# Patient Record
Sex: FEMALE | Race: WHITE | URBAN - NONMETROPOLITAN AREA
[De-identification: names, ages, dates, MRNs, and addresses within clinical notes are randomized per-mention and may not be internally consistent; named-entity substitution may affect disease eponyms.]

---

## 2017-01-01 ENCOUNTER — HISTORY (OUTPATIENT)
Dept: EMERGENCY MEDICINE | Facility: OTHER | Age: 82
End: 2017-01-01

## 2017-01-01 ENCOUNTER — COMMUNICATION - GICH (OUTPATIENT)
Dept: FAMILY MEDICINE | Facility: OTHER | Age: 82
End: 2017-01-01

## 2017-01-01 ENCOUNTER — OFFICE VISIT - GICH (OUTPATIENT)
Dept: FAMILY MEDICINE | Facility: OTHER | Age: 82
End: 2017-01-01

## 2017-01-01 ENCOUNTER — AMBULATORY - GICH (OUTPATIENT)
Dept: SCHEDULING | Facility: OTHER | Age: 82
End: 2017-01-01

## 2017-01-01 ENCOUNTER — COMMUNICATION - GICH (OUTPATIENT)
Dept: PEDIATRICS | Facility: OTHER | Age: 82
End: 2017-01-01

## 2017-01-01 ENCOUNTER — HISTORY (OUTPATIENT)
Dept: FAMILY MEDICINE | Facility: OTHER | Age: 82
End: 2017-01-01

## 2017-01-01 ENCOUNTER — HISTORY (OUTPATIENT)
Dept: MEDSURG UNIT | Facility: OTHER | Age: 82
End: 2017-01-01

## 2017-01-01 ENCOUNTER — HOSPITAL ENCOUNTER (OUTPATIENT)
Dept: MEDSURG UNIT | Facility: OTHER | Age: 82
Setting detail: OBSERVATION
Discharge: NURSING, REHAB OR LONG-TERM CARE | End: 2017-06-07
Attending: EMERGENCY MEDICINE

## 2017-01-01 DIAGNOSIS — M48.50XG: ICD-10-CM

## 2017-01-01 DIAGNOSIS — I48.19 PERSISTENT ATRIAL FIBRILLATION (H): ICD-10-CM

## 2017-01-01 DIAGNOSIS — S72.116D: ICD-10-CM

## 2017-01-01 DIAGNOSIS — S72.115D CLOSED NONDISPLACED FRACTURE OF GREATER TROCHANTER OF LEFT FEMUR WITH ROUTINE HEALING: ICD-10-CM

## 2017-01-01 DIAGNOSIS — S72.001A CLOSED FRACTURE OF NECK OF RIGHT FEMUR (H): ICD-10-CM

## 2017-01-01 DIAGNOSIS — Z20.828 CONTACT WITH AND (SUSPECTED) EXPOSURE TO OTHER VIRAL COMMUNICABLE DISEASES: ICD-10-CM

## 2017-01-01 DIAGNOSIS — F03.90 DEMENTIA WITHOUT BEHAVIORAL DISTURBANCE (H): ICD-10-CM

## 2017-01-01 DIAGNOSIS — R53.1 WEAKNESS: ICD-10-CM

## 2017-01-01 DIAGNOSIS — S72.002S FRACTURE OF UNSPECIFIED PART OF NECK OF LEFT FEMUR, SEQUELA: ICD-10-CM

## 2017-01-01 LAB
A/G RATIO - HISTORICAL: 1.2 (ref 1–2)
ABSOLUTE BASOPHILS - HISTORICAL: 0 THOU/CU MM
ABSOLUTE EOSINOPHILS - HISTORICAL: 0 THOU/CU MM
ABSOLUTE LYMPHOCYTES - HISTORICAL: 0.8 THOU/CU MM (ref 0.9–2.9)
ABSOLUTE MONOCYTES - HISTORICAL: 0.9 THOU/CU MM
ABSOLUTE NEUTROPHILS - HISTORICAL: 10 THOU/CU MM (ref 1.7–7)
ALBUMIN SERPL-MCNC: 3.7 G/DL (ref 3.5–5.7)
ALP SERPL-CCNC: 55 IU/L (ref 34–104)
ALT (SGPT) - HISTORICAL: 8 IU/L (ref 7–52)
ANION GAP - HISTORICAL: 10 (ref 5–18)
AST SERPL-CCNC: 17 IU/L (ref 13–39)
BASOPHILS # BLD AUTO: 0.2 %
BILIRUB SERPL-MCNC: 0.6 MG/DL (ref 0.3–1)
BUN SERPL-MCNC: 10 MG/DL (ref 7–25)
BUN/CREAT RATIO - HISTORICAL: 14
CALCIUM SERPL-MCNC: 9 MG/DL (ref 8.6–10.3)
CHLORIDE SERPLBLD-SCNC: 105 MMOL/L (ref 98–107)
CO2 SERPL-SCNC: 23 MMOL/L (ref 21–31)
CREAT SERPL-MCNC: 0.74 MG/DL (ref 0.7–1.3)
EOSINOPHIL NFR BLD AUTO: 0 %
ERYTHROCYTE [DISTWIDTH] IN BLOOD BY AUTOMATED COUNT: 13.5 % (ref 11.5–15.5)
GFR IF NOT AFRICAN AMERICAN - HISTORICAL: >60 ML/MIN/1.73M2
GLOBULIN - HISTORICAL: 3 G/DL (ref 2–3.7)
GLUCOSE SERPL-MCNC: 126 MG/DL (ref 70–105)
HCT VFR BLD AUTO: 35.7 % (ref 33–51)
HEMOGLOBIN: 12.1 G/DL (ref 12–16)
LYMPHOCYTES NFR BLD AUTO: 6.8 % (ref 20–44)
MCH RBC QN AUTO: 33 PG (ref 26–34)
MCHC RBC AUTO-ENTMCNC: 33.9 G/DL (ref 32–36)
MCV RBC AUTO: 97 FL (ref 80–100)
MONOCYTES NFR BLD AUTO: 7.5 %
NEUTROPHILS NFR BLD AUTO: 85.2 % (ref 42–72)
PLATELET # BLD AUTO: 229 THOU/CU MM (ref 140–440)
PMV BLD: 11.6 FL (ref 6.5–11)
POTASSIUM SERPL-SCNC: 3.7 MMOL/L (ref 3.5–5.1)
PROT SERPL-MCNC: 6.7 G/DL (ref 6.4–8.9)
RED BLOOD COUNT - HISTORICAL: 3.67 MIL/CU MM (ref 4–5.2)
SODIUM SERPL-SCNC: 138 MMOL/L (ref 133–143)
WHITE BLOOD COUNT - HISTORICAL: 11.7 THOU/CU MM (ref 4.5–11)

## 2017-12-27 NOTE — PROGRESS NOTES
Patient Information     Patient Name MRN Sex Yamilet Patel [id number] Female 3/31/1928      Progress Notes by Sharonda Persaud PharmD at 2017 11:20 AM     Author:  Sharonda Persaud PharmD Service:  (none) Author Type:  PHARM- Pharmacist     Filed:  2017 11:20 AM Date of Service:  2017 11:20 AM Status:  Signed     :  Sharonda Persaud PharmD (PHARM- Pharmacist)            Pharmacy: Discharge Counseling and Medication Reconciliation    Yamilet Chavarria  [address]   [place] [address]    Home Phone [phone]   Work Phone Not on file.     89 y.o. female  PCP:Madonna Carroll MD    Allergies     Allergen  Reactions     Amoxicillin Rash     Pcn [Penicillins] Syncope       Discharge Medication Reconciliation:    Sharonda Persaud PharmD has reviewed the patient's discharge medication orders and has compared them to the inpatient medication administration record and to what the patient was taking prior to admission- any discrepancies have been resolved.     Thank you for the consult.     Sharonda Persaud PharmD ....................  2017   11:20 AM

## 2017-12-27 NOTE — PROGRESS NOTES
Patient Information     Patient Name MRN Sex Yamilet Patel [id number] Female 3/31/1928      Progress Notes by Jelly Cam PharmD at 2017 12:26 PM     Author:  Jelly Cam PharmD Service:  (none) Author Type:  PHARM- Pharmacist     Filed:  2017 12:29 PM Date of Service:  2017 12:26 PM Status:  Signed     :  Jelly Cam PharmD (PHARM- Pharmacist)            Pharmacy -- Admission Medication Reconciliation    Prior to admission (PTA) medications were reviewed and the patient's PTA medication list was updated.     Sources Consulted: [place] [facility], [facility]    The reliability of this Medication Reconciliation is: HIGH.    The following significant changes were made:  Morphine sulfate 20 mg/mL UPDATED to reflect current order  Acetaminophen CHANGED to q4h scheduled  Senna CHANGE to PRN    Note: patient has received one dose of lorazepam and one dose of morphine in  per [place] [facility]    In addition, the patient's allergies were reviewed with the patient and updated as follows -  Allergies     Allergen  Reactions     Amoxicillin Rash     Pcn [Penicillins] Syncope       The pharmacist has reviewed with the patient that all personal medications should be removed from the building or locked in the belongings safe. Patient shall only take medications ordered by the physician and administered by the nursing staff.     Pharmacy Discharge Planning      Medication barriers identified:  none    Medication adherence concerns:  none    Understanding of emergency medications:  Has morphine/lorazepam PRN    Jelly Cam PharmD ....................  2017   12:26 PM

## 2017-12-27 NOTE — PROGRESS NOTES
Patient Information     Patient Name MRN Yamilet Gurrola [id number] Female 3/31/1928      Progress Notes by Kely Stallworth RN at 2017  1:24 PM     Author:  Kely Stallworth RN Service:  (none) Author Type:  NURS- Registered Nurse     Filed:  2017  1:24 PM Date of Service:  2017  1:24 PM Status:  Signed     :  Kely Stallworth RN (NURS- Registered Nurse)            Second attempt made for nurse to nurse.Kely Stallworth RN ....................  2017   1:24 PM

## 2017-12-27 NOTE — PROGRESS NOTES
Patient Information     Patient Name MRN Yamilet Gurrola [id number] Female 3/31/1928      Progress Notes by Kely Stallworth RN at 2017  1:42 PM     Author:  Kely Stallworth RN Service:  (none) Author Type:  NURS- Registered Nurse     Filed:  2017  1:44 PM Date of Service:  2017  1:42 PM Status:  Signed     :  Kely Stallworth RN (NURS- Registered Nurse)            Report called to TYRON Amaya. Pt is at facility and family is present.

## 2017-12-27 NOTE — PROGRESS NOTES
Patient Information     Patient Name MRN Sex Yamilet Patel [id number] Female 3/31/1928      Progress Notes by Komal Laura at 2017  6:08 AM     Author:  Komal Laura Service:  (none) Author Type:  NURS- Registered Nurse     Filed:  2017  6:08 AM Date of Service:  2017  6:08 AM Status:  Signed     :  Komal Laura (NURS- Registered Nurse)            Problem: MUSCULOSKELETAL  Goal: MAINTAIN PROPER ALIGNMENT OF AFFECTED BODY PART  Outcome: Problem reviewed and goal still appropriate  Care Plan Progress Note     Data: Pt kept as comfortable as possible throughout the night. RR in the 20s. Pain described in the R hip where fracture is located. Skin looks good. No IV access. Davidson in place with thick, cloudy, tan urine.      Action: PRN morphine x2 given for repositioning. Tolerated well. Repo q2 hrs. Not tolerating the R side. Given Ice Cream before bed.     Response: Pt is tolerant of cares and is cooperative. Continue to keep comfortable and follow the appropriate plan of care.   Komal Laura RN ....................  2017   6:08 AM

## 2017-12-28 NOTE — CARE COORDINATION
Patient Information     Patient Name MRN Yamilet Gurrola [id number] Female 3/31/1928      Case Mgmt by Jas Navarrete LSW at 2017 10:35 AM     Author:  Jas Navarrete LSW Service:  (none) Author Type:  SWS- Licensed Social Worker     Filed:  2017 10:39 AM Date of Service:  2017 10:35 AM Status:  Signed     :  Jas Navarrete LSW ([facility]- Licensed Social Worker)            :    Discharge today back to [facility] assisted living with hospice care.  I called Magda at [facility] and she was updated with discharge plan.  I called Monie from hospice and also gave her discharge update. She stated DME will be delivered today before 1300.  [facility] was called for stretcher transport and will be here at 1300 to transport. I called patients daughter Obey and gave her discharge update.      ADRYAN Prasad ....................  2017   10:37 AM

## 2017-12-28 NOTE — PROGRESS NOTES
Patient Information     Patient Name MRN Sex Yamilet Patel [id number] Female 3/31/1928      Progress Notes by Kely Stallworth RN at 2017  8:20 AM     Author:  Kely Stallworth RN Service:  (none) Author Type:  NURS- Registered Nurse     Filed:  2017  8:21 AM Date of Service:  2017  8:20 AM Status:  Signed     :  Kely Stallworth RN (NURS- Registered Nurse)            Ice pack applied to right hip. Pt repositioned.

## 2017-12-28 NOTE — PROGRESS NOTES
Patient Information     Patient Name MRN Sex Yamilet Patel [id number] Female 3/31/1928      Progress Notes by Nurys Hopper RN at 2017 12:30 PM     Author:  Nurys Hopper RN Service:  (none) Author Type:  NURS- Registered Nurse     Filed:  2017  4:52 PM Date of Service:  2017 12:30 PM Status:  Signed     :  Nurys Hopper RN (NURS- Registered Nurse)            Admission Note    Data:  Yamilet Chavarria admitted to 312 from emergency department via cart.      Action:  Family and Dr. Chavez have been notified of admission.      Response:  Patient tolerated transfer. and Patient is stable.

## 2017-12-28 NOTE — TELEPHONE ENCOUNTER
Patient Information     Patient Name MRN Yamilet Gurrola [id number] Female 3/31/1928      Telephone Encounter by Tania Cantu at 2017  9:50 AM     Author:  Tania Cantu Service:  (none) Author Type:  (none)     Filed:  2017  9:52 AM Encounter Date:  2017 Status:  Signed     :  Tania Cantu            SER- PT PASSED AWAY TODAY AT ABOUT 7:30. ANY QUESTIONS YOU CAN CALL [phone]. THANKS.

## 2017-12-29 NOTE — ED PROVIDER NOTES
Patient Information     Patient Name MRN Sex Yamilet Patel [id number] Female 3/31/1928      ED Provider Note by Kel Sandy MD at 2017  7:59 AM     Author:  Kel Sandy MD Service:  (none) Author Type:  Physician     Filed:  2017 11:25 AM Date of Service:  2017  7:59 AM Status:  Signed     :  Kel Sandy MD (Physician)            PATIENT:  Yamilet Chavarria       89 y.o.       female       MRN #:  [id number]    CHIEF COMPLAINT:  Musculoskeletal Problem      HPI Comments: Patient comes in from local assisted living after having fallen twice last night. After the second time they noticed that her right leg was externally rotated and shorter than the left. She was unable to stand after this. She was given some oral morphine at home. She was then transferred in via ambulance. She has severe dementia, her daughter is here with hopes of history. Has not been ill otherwise.    Patient is a 89 y.o. female presenting with hip injury. The history is provided by the patient and a relative.   Hip Injury        ALLERGIES:  Amoxicillin and Pcn [penicillins]    CURRENT MEDICATIONS:      ibuprofen (ADVIL; MOTRIN) 800 mg tablet   LORazepam (ATIVAN) 0.5 mg tab   MAPAP EXTRA STRENGTH 500 mg tablet   medication order composer   medication order composer   SENEXON-S 8.6-50 mg tablet   Wheel Chair     PROBLEM LIST:       Atrial fibrillation (HC)      Closed nondisp fx of greater trochanter of femur with routine healing      Compression fracture of vertebral column with delayed healing      Closed fracture of pubic ramus (HC)      Alzheimer's dementia      Toenail fungus        PAST MEDICAL HISTORY:  No past medical history on file.  PAST SURGICAL HISTORY:    Past Surgical History:      Procedure  Laterality Date     No previous surgery       FAMILY HISTORY:  No family history on file.  SOCIAL HISTORY:  Marital Status:   [5]  Employment Status:  Retired [5]   Occupation:    Substance  "Use:  Smoking status: Never Smoker                                                              Smokeless status: Never Used                      Alcohol use: No                 Review of Systems   Unable to perform ROS: Dementia        Patient Vitals for the past 24 hrs:   BP Temp Pulse SpO2 Height Weight   06/06/17 1000 123/62 - 75 - - -   06/06/17 0930 (!) 79/66 - 79 98 % - -   06/06/17 0830 102/58 - 73 98 % - -   06/06/17 0800 124/73 - 70 99 % - -   06/06/17 0737 120/97 96  F (35.6  C) 61 98 % 1.626 m (5' 4\") 59 kg (130 lb)      Physical Exam   Constitutional: She appears well-developed and well-nourished. No distress.   HENT:   Head: Normocephalic and atraumatic.   Eyes: Conjunctivae are normal.   Neck: Neck supple.   Cardiovascular: Normal rate, regular rhythm and normal heart sounds.    Pulmonary/Chest: Effort normal and breath sounds normal. No respiratory distress.   Abdominal: Bowel sounds are normal. She exhibits no distension. There is no tenderness.   Musculoskeletal:   Right leg is externally rotated and about 2 inches shorter than the left. When I rotate the leg from side to side she has pain in her hip area.   Skin: Skin is warm and dry. She is not diaphoretic.   She has a skin tear on her right forearm that is covered with some Adaptic type covering.   Psychiatric: She has a normal mood and affect.   Nursing note and vitals reviewed.         IMAGING:  XR PELVIS 1 VIEW AP AND HIP 2 VIEW BILATERAL (Final result)  Result time: 06/06/17 09:33:35     Procedure changed from XR HIP 2 OR 3 VIEWS W PELVIS RIGHT            Final result by Interface, [facility] (06/06/17 09:33:35)      Narrative:      XR PELVIS 1 VIEW AP AND HIP 2 VIEW BILATERAL    HISTORY: 89 yearsFemale MUSCULOSKELETAL PROBLEM;     TECHNIQUE: A total of 7 views were obtained.    COMPARISON: 01/03/2017    FINDINGS: There is an acute intertrochanteric fracture of the right hip. There is coxa vera angulation and loss of length. There is " inferior displacement of the inferior trochanter fragment the hip joint is congruent.    There is a corticated nonacute fracture of the greater trochanter of the left hip. This was seen on the prior study. The fracture is incompletely healed.    The sacral iliac joints pubic symphysis and left hip joint are congruent. There is advanced left hip osteoarthritis.    There is osteopenia.    IMPRESSION: Acute,  intertrochanteric fracture of the right hip with loss of length and coxa vera alignment. There is an inferiorly displaced fracture of the lesser trochanter.    Nonacute incompletely healed greater trochanter fracture of the left hip as was seen on 01/03/2017    Electronically Signed By: Jono Carey M.D. on 6/6/2017 9:29 AM          LABORATORY:  Results for orders placed or performed during the hospital encounter of 06/06/17       COMP METABOLIC PANEL       Result  Value Ref Range Status    SODIUM 138 133 - 143 mmol/L Final    POTASSIUM 3.7 3.5 - 5.1 mmol/L Final    CHLORIDE 105 98 - 107 mmol/L Final    CO2,TOTAL 23 21 - 31 mmol/L Final    ANION GAP 10 5 - 18                 Final    GLUCOSE 126 (H) 70 - 105 mg/dL Final    CALCIUM 9.0 8.6 - 10.3 mg/dL Final    BUN 10 7 - 25 mg/dL Final    CREATININE 0.74 0.70 - 1.30 mg/dL Final    BUN/CREAT RATIO           14                 Final    GFR if African American >60 >60 ml/min/1.73m2 Final    GFR if not African American >60 >60 ml/min/1.73m2 Final    ALBUMIN 3.7 3.5 - 5.7 g/dL Final    PROTEIN,TOTAL 6.7 6.4 - 8.9 g/dL Final    GLOBULIN                  3.0 2.0 - 3.7 g/dL Final    A/G RATIO 1.2 1.0 - 2.0                 Final    BILIRUBIN,TOTAL 0.6 0.3 - 1.0 mg/dL Final    ALK PHOSPHATASE 55 34 - 104 IU/L Final    ALT (SGPT) 8 7 - 52 IU/L Final    AST (SGOT) 17 13 - 39 IU/L Final   CBC WITH AUTO DIFFERENTIAL       Result  Value Ref Range Status    WHITE BLOOD COUNT         11.7 (H) 4.5 - 11.0 thou/cu mm Final    RED BLOOD COUNT           3.67 (L) 4.00 - 5.20  mil/cu mm Final    HEMOGLOBIN                12.1 12.0 - 16.0 g/dL Final    HEMATOCRIT                35.7 33.0 - 51.0 % Final    MCV                       97 80 - 100 fL Final    MCH                       33.0 26.0 - 34.0 pg Final    MCHC                      33.9 32.0 - 36.0 g/dL Final    RDW                       13.5 11.5 - 15.5 % Final    PLATELET COUNT            229 140 - 440 thou/cu mm Final    MPV                       11.6 (H) 6.5 - 11.0 fL Final    NEUTROPHILS               85.2 (H) 42.0 - 72.0 % Final    LYMPHOCYTES               6.8 (L) 20.0 - 44.0 % Final    MONOCYTES                 7.5 <12.0 % Final    EOSINOPHILS               0.0 <8.0 % Final    BASOPHILS                 0.2 <3.0 % Final    ABSOLUTE NEUTROPHILS      10.0 (H) 1.7 - 7.0 thou/cu mm Final    ABSOLUTE LYMPHOCYTES      0.8 (L) 0.9 - 2.9 thou/cu mm Final    ABSOLUTE MONOCYTES        0.9 (H) <0.9 thou/cu mm Final    ABSOLUTE EOSINOPHILS      0.0 <0.5 thou/cu mm Final    ABSOLUTE BASOPHILS        0.0 <0.3 thou/cu mm Final         ED COURSE:  ED Course     0802  it certainly appears as though she has fractured her right hip. We will get an x-ray. Also check some basic labs. She is not in any pain while she is laying still, we'll place an IV and ordered some when necessary fentanyl.    Patient with hip fracture as above. I spoke with Dr. Morrell from orthopedics. The patient's daughter is here and has conversation with Dr. Morrell regarding options. The patient does have severe dementia. The daughter has spoken with other family members and they aren't electing to not have this repaired at this time. They would like her to go back to her assisted living with a hospital bed and hospice. I did speak with hospice, and they will be happy to work with the patient, however it is not something that will be able to be arranged today. Therefore I have spoken with Dr. Villalta  hospitalists, and she will be admitted here for observation and hopefully  tomorrow can get her moved back to her assisted living.    ENCOUNTER DIAGNOSES:  ENCOUNTER DIAGNOSES        ICD-10-CM    1. Hip fracture, right, closed, initial encounter S72.001A        Coding    ROBERT MCCOY MD  DOS: 6/6/2017   [facility]

## 2017-12-29 NOTE — H&P
Patient Information     Patient Name MRN Yamilet Gurrola [id number] Female 3/31/1928      H&P by Harinder Chavez MD at 2017  1:51 PM     Author:  Harinder Chavez MD Service:  (none) Author Type:  Physician     Filed:  2017  2:32 PM Date of Service:  2017  1:51 PM Status:  Signed     :  Harinder Chavez MD (Physician)            ADMISSION HISTORY AND PHYSICAL    Date of Face to Face Service 2017     Yamilet Chavarria   [address] [address]   [place] [address]  89 y.o. female  Admission Date/Time: 2017  7:35 AM    Primary Care Provider / Referring Physician:  Madonna Carroll MD  Hospital Attending Physician:  Harinder Chavez MD   Informant:  patient    PATIENT PROFILE:    Social History Narrative    Lives at [facility];  Daughter is Obey Brennan MD who is also healthcare power of .  [phone];   Never smoker.  Non drinker.        CHIEF COMPLAINT:  fall    Patient Active Problem List       Diagnosis  Date Noted     Closed fracture of right hip (HC)  2017     Atrial fibrillation (HC)  2017     Not candidate for coumadin due falls risk.   POA declines asa        Closed nondisp fx of greater trochanter of femur with routine healing  2017     Compression fracture of vertebral column with delayed healing  2017     Closed fracture of pubic ramus (HC)       Alzheimer's dementia  2015     2010        Toenail fungus  2015       HPI:   89-year-old female history of dementia presenting with intertrochanteric hip fracture of the right. Per report patient fell twice in the prior to presentation. She was immediately unable to bear weight, received oral morphine chart he had prescribed and was brought to the emergency department. There underwent routine lab work and x-rays with notable fracture on the right and a healing intertrochanteric fracture on the left.  Recent emergency room provider discussed treatment options with family as well as  with orthopedic surgery. Per report family has elected not to undergo surgical repair and have elected for comfort care only.     REVIEW OF SYSTEMS:  Patient denies any pain while laying in bed, she would like to have some lunch, otherwise denies any nausea or vomiting and has no other new complaints. She repeatedly talks about [employer] and being from [place] and given her dementia is a poor historian and unable to give any further specifics regarding symptoms or review of systems.    ALLERGIES/SENSITIVITIES:  Allergies     Allergen  Reactions     Amoxicillin Rash     Pcn [Penicillins] Syncope       Social History     Social History Main Topics       Smoking status: Never Smoker     Smokeless tobacco: Never Used     Alcohol use No     Drug use: No     Sexual activity: No     Family History       Problem   Relation Age of Onset     No Known Problems  Other      noncontributory          PREADMISSION MEDICATIONS  Current Facility-Administered Medications        Medication  Dose Route Frequency Last Rate     acetaminophen 500 mg tablet (TYLENOL EXTRA STRGTH)  500 mg Oral q4h       acetaminophen 650 mg tablet (TYLENOL)  650 mg Oral q4h prn       acetaminophen suppository 650 mg (TYLENOL)  650 mg Rectal q4h prn       artificial tear ophthalmic ointment 0.035 g (ARTIFICIAL TEARS)  1 Strip Both Eyes bedtime prn       bisacodyl 10 mg suppository (DULCOLAX)  10 mg Rectal daily prn       diphenhydrAMINE 12.5-25 mg injection (BENADRYL)  12.5-25 mg Intravenous q6h prn       fentaNYL 25 mcg injection (SUBLIMAZE)  25 mcg Intravenous q10min prn       HYDROmorphone 0.2-1 mg injection (DILAUDID)  0.2-1 mg Intravenous q1h prn       LORazepam 0.5-2 mg tablet (ATIVAN)  0.5-2 mg Oral q4h prn      Or         LORazepam 0.5-2 mg tablet (ATIVAN)  0.5-2 mg Sublingual q4h prn       milk of magnesia 30 mL suspension (MOM)  30 mL Oral daily prn       morphine CONCENTRATE 2.5-5 mg oral liquid (ROXANOL)  2.5-5 mg Sublingual q1h prn       NaCl 0.9%  "  10 mL/hr Intravenous continuous       ondansetron 4 mg injection (ZOFRAN)  4 mg Intravenous q6h prn       polyvinyl alcohol ophthalmic solution 2 Drop (ARTIFICAL TEARS)  2 Drop Both Eyes q1h prn       prochlorperazine 10 mg tablet (COMPAZINE)  10 mg Oral q6h prn       prochlorperazine suppository 25 mg (COMPAZINE)  25 mg Rectal q12h prn       sennosides-docusate (8.6-50 mg) 1 tablet (SENOKOT S)  1 tablet Oral daily prn         PHYSICAL EXAM:    /54  Pulse 75  Temp 98.2  F (36.8  C)  Resp 22  Ht 1.626 m (5' 4\")  Wt 57.6 kg (126 lb 15.8 oz)  SpO2 97%  Breastfeeding? No  BMI 21.8 kg/m2    Body mass index is 21.8 kg/(m^2).    Exam:   GENERAL: Talkative, in no apparent distress.  Head: normocephalic and atraumatic  Eyes: anicteric and non-injected sclera  Nose: no rhinorrhea or epistaxis.   Throat: dry mucous membranes with no active oral lesions.  NECK: Supple, jugular venous distension not present.  CARDIOVASCULAR: regular rate and rhythm, no murmurs, rubs, or gallops. Normal S1/S2. No lower extremity edema.   RESPIRATORY: clear to auscultation bilaterally, no wheezes, no crackles.    GI: soft, non-tender, non-distended, normoactive bowel sounds. Davidson in place with lisy colored urine.  MUSCULOSKELETAL: warm and well perfused, 2+ dorsalis pedis pulses.  Clearly externally rotated and shortened right leg.  SKIN: Skin tear lateral to the right elbow covered with Tegaderm, clean dry and intact.  NEUROLOGY: AAOx1 to person, she is unable to remember her daughters name without prompting, follows commands, speech and language without focal deficits.      Laboratory:  Recent Labs       06/06/17   0805   WBC  11.7 H   HGB  12.1   MCV  97   PLT  229      Recent Labs       06/06/17   0805   SODIUM  138   POTASSIUM  3.7   CHLORIDE  105   [id number]  23   BUN  10   CREATININE  0.74   GLUCOSE  126 H   CALCIUM  9.0      Recent Labs       06/06/17   0805   ALBUMIN  3.7   BILITOTAL  0.6   ALT  8   AST  17   PROTEIN  6.7 "     Additional Comments:   I reviewed the patient's new clinical lab test results.   I reviewed the patient s new imaging test results.      Assessment and Plan:       Closed fracture of right hip (HC) (6/6/2017)    Assessment: Discussed with family regarding treatment options and they remain consistent that patient would want to pursue comfort measures only. Given this they have opted for nonsurgical management with hopeful prompt discharge back to assisted living with the aid of hospice in 1-2 days.     Plan: - comfort care orderset placed   - ip hospice consult   - prescription for hospital bed completed    Harinder Chavez MD

## 2017-12-29 NOTE — ED NOTES
Patient Information     Patient Name MRN Yamilet Gurrola [id number] Female 3/31/1928      ED Nursing Note by Yvette Nagel RN at 2017  7:42 AM     Author:  Yvette Nagel RN Service:  (none) Author Type:  NURS- Registered Nurse     Filed:  2017  7:43 AM Date of Service:  2017  7:42 AM Status:  Signed     :  Yvette Nagel RN (NURS- Registered Nurse)            EMS Arrival Note  ________________________________    Pre hospital clinical presentation per EMS personnel and family member includes pt fell X2 during the night, the last time at 0600 when she had external rotation and shortening of the right hip.  Patient is alert.    Pre hospital care included: splint on right leg    Pre hospital prior living situation: Assisted Living

## 2017-12-29 NOTE — ED NOTES
Patient Information     Patient Name MRN Sex Yamilet Patel [id number] Female 3/31/1928      ED Nursing Note by Yvette Nagel RN at 2017  7:55 AM     Author:  Yvette Nagel RN Service:  (none) Author Type:  NURS- Registered Nurse     Filed:  2017  7:56 AM Date of Service:  2017  7:55 AM Status:  Signed     :  Yvette Nagel RN (NURS- Registered Nurse)            Daughter and son in law in room with pt.

## 2017-12-29 NOTE — ED NOTES
Patient Information     Patient Name MRN Sex Yamilet Patel [id number] Female 3/31/1928      ED Nursing Note by Yvette Nagel RN at 2017 12:10 PM     Author:  Yvette Nagel RN Service:  (none) Author Type:  NURS- Registered Nurse     Filed:  2017 12:11 PM Date of Service:  2017 12:10 PM Status:  Signed     :  Yvette Nagel RN (NURS- Registered Nurse)            ADMISSION/TRANSPORT NOTE    Yamilet Chavarria will be transported to [facility] by cart with IV(s). IV Site #1 Status: infusing with fluids: NS .    Transport team included: ED Tech    Report called to TYRON Nuno    Pain Level: 5/10  Acute Pain Intensity (0-10): 5  Functional Pain Scale (0-5): Tolerable (and doesn't prevent any activities)    Disposition of Patient Belongings: Clothing to room with pt and dtr.

## 2017-12-29 NOTE — ED NOTES
Patient Information     Patient Name MRN Sex Yamilte Patel [id number] Female 3/31/1928      ED Nursing Note by Yvette Nagel RN at 2017 11:38 AM     Author:  Yvette Nagel RN Service:  (none) Author Type:  NURS- Registered Nurse     Filed:  2017 11:39 AM Date of Service:  2017 11:38 AM Status:  Signed     :  Yvette Nagel RN (NURS- Registered Nurse)            Pt to be admitted, Hospice notified by Dr. Sandy and information requested by Hospice faxed.  MSP aware.  Waiting to give report.

## 2017-12-29 NOTE — ED NOTES
Patient Information     Patient Name MRN Sex Yamilet Patel [id number] Female 3/31/1928      ED Nursing Note by Yvette Nagel RN at 2017  7:52 AM     Author:  Yvette Nagel RN Service:  (none) Author Type:  NURS- Registered Nurse     Filed:  2017  7:55 AM Date of Service:  2017  7:52 AM Status:  Signed     :  Yvette Nagel RN (NURS- Registered Nurse)            Settled in [place] [address], call light in place, name and birth date verified with dtr.  Blankets provided.  Side rails up for safety.    ED Nursing Assessment  ________________________________    GENERAL APPEARANCE:   Alert  EXTREMITIES/SKIN:   Abrasion present when arrived with a tegaderm over it  NEUROLOGIC:   Orientation/LOC status: (A) alert, (V) responsive to voice and oriented to person, Behavior is cooperative and speech is clear, hx of dementia  RESPIRATORY:   Breath Sounds are clear, Oxygenation Saturation on room air is 95 %, Respirations are normal  CARDIAC:   Chest Pain is absent, Rate is irregular, Rhythm (heart) status: see rhythm strips  ABDOMEN/GI:   Contour of abdomen is flat and soft, Bowel sounds are normal, Functional status: other unsure when ate or drank last, did have MS at 0600  GENITOURINARY:   Urination: incontinence  MUSCULOSKELETAL:   EXTREMETIES   Affected Extremity Right lower extremity hip shortening and rotated after a fall.

## 2017-12-30 NOTE — DISCHARGE SUMMARY
Patient Information     Patient Name MRN Sex Yamilet Patel [id number] Female 3/31/1928      Discharge Summaries by Harinder Chavez MD at 2017 10:16 AM     Author:  Harinder Chavez MD Service:  (none) Author Type:  Physician     Filed:  2017 10:25 AM Date of Service:  2017 10:16 AM Status:  Signed     :  Harinder Chavez MD (Physician)            HOSPITAL DISCHARGE SUMMARY    Patient Name: Yamilet Chavarria  YOB: 1928  Age: 89 y.o.  Medical Record Number: [id number]  Primary Physician: Madonna Carroll MD  Phone: [phone]  Admission Date: 2017  Discharge Date: 2017     She will be discharged from [facility] to [facility] with Hospice.    PRINCIPAL DISCHARGE DIAGNOSIS: Right intertrochanteric hip fracture    BRIEF HOSPITAL COURSE:  89-year-old female hx of dementia presenting with intertrochanteric hip fracture of the right. Per report patient fell twice in the night prior to presentation. She was immediately unable to bear weight on the affected side, received oral morphine,  and was brought to the emergency department. There she underwent routine lab work and x-rays with notable fracture on the right and a healing intertrochanteric fracture on the left and was placed on observation for further care.      Closed fracture of right hip (HC) (2017): Discussed with family regarding treatment options and they remained consistent that patient would want to pursue comfort measures only. Given this they opted for nonsurgical management with enrollment in hospice.  DME including hospital bed prescription was provided. She already had ativan and roxanol medications at home, but dosing was liberalized, to be tailored to her needs to optimize comfort. She did have a white placed and this will need to be changed monthly per routine.     Exam on day of discharge:  Gen: Laying in bed, verbalizes spontaneously and awakens easily  "to voice, no apparent distress  MSK: right leg with clear external rotation and shortening  CV: Regular rate and rhythm  Pulm: Clear to auscultation bilaterally in all fields    PROCEDURES PERFORMED DURING HOSPITALIZATION:   X-ray pelvis and hip bilateral: IMPRESSION: Acute,  intertrochanteric fracture of the right hip with loss of length and coxa vera alignment. There is an inferiorly displaced fracture of the lesser trochanter.  Nonacute incompletely healed greater trochanter fracture of the left hip as was seen on 01/03/2017     COMPLICATIONS IN HOSPITAL: None    PERTINENT FINDINGS/RESULTS AT DISCHARGE: /54  Pulse 75  Temp 98.2  F (36.8  C)  Resp (!) 26  Ht 1.626 m (5' 4\")  Wt 57.6 kg (126 lb 15.8 oz)  SpO2 97%  Breastfeeding? No  BMI 21.8 kg/m2   Patient Vitals for the past 72 hrs:   Weight   06/06/17 1230 57.6 kg (126 lb 15.8 oz)   06/06/17 0737 59 kg (130 lb)    None    Latest Laboratory Results:  Chem:  Recent Labs       06/06/17   0805   SODIUM  138   POTASSIUM  3.7   CREATININE  0.74     WBC/Hgb:  Recent Labs       06/06/17   0805   WBC  11.7 H   HGB  12.1     INR:  No results for input(s): INR in the last 720 hours.      IMPORTANT PENDING TEST RESULTS:       These Lab Items may not have been resulted by the time the patient was discharged:            None          CONDITION AT DISCHARGE:    Terminal    DISCHARGE ORDERS     Your Home Medicines      CHANGE how you take these medicines       Instructions    LORazepam 0.5 mg Tab   For diagnoses:  Closed nondisplaced fracture of greater trochanter of left femur with routine healing, subsequent encounter   What changed:  See the new instructions.   Commonly known as:  ATIVAN    Take 1-2 tablets by mouth every hour if needed for Anxiety, Nausea/Vomiting or Muscle Spasm.       morphine CONCENTRATE 20 mg/mL concentrated solution   For diagnoses:  Hip fracture, right, closed, initial encounter   What changed:    - medication strength  - how much to " take   Commonly known as:  ROXANOL    Take 0.1-0.5 mL by mouth every 2 hours if needed  (PAIN)         CONTINUE taking these medicines       Instructions    acetaminophen 500 mg tablet   Commonly known as:  TYLENOL EXTRA STRGTH    Take 500 mg by mouth every 4 hours. Max acetaminophen dose: 4000mg in 24 hrs.       ibuprofen 200 mg tablet   Commonly known as:  ADVIL; MOTRIN    Take 800 mg by mouth every 6 hours if needed for Pain.       medication order composer    Ap Becalm'd capsules: Take two capsules as needed for behaviors. [Contains:Vitamin B6 (Pyridoxal 5 Phosphate), Folic Acid (Folate), Calcium (Citrate), Magnesium (Citrate), D/L-Phenylalanine, L-Glutamine, 5 HTP (5 Hydroxy Tryptophan)]       SENEXON-S 8.6-50 mg tablet   Generic drug:  sennosides-docusate (8.6-50 mg)    Take 1 tablet by mouth once daily if needed for Constipation.       Wheel Chair   For diagnoses:  Closed left hip fracture, sequela    Wheelchair: Standard  with leg rests: Swing away Length of need: 3 months Dx. left greater trochanter,   pubic rami fractures           After Discharge Orders and Instructions     Additional information about your medicines:       Use the morphine as needed for pain and the Ativan as needed for anxiety and muscle spasms, nausea, and vomiting.  These frequencies and doses of medication may be adjusted based on hospice preference.       Other activities:       Strict non-weight bearing on the right side for at least 30 days       Primary Care Provider follow up appointment(s)       Madonna Carroll MD as needed       Regular Diet       Eat a wide variety of foods, including fruits and vegetable, dairy, grains and meats.  Limit the amount of solid fat such as butter, margarine and shortening.  Get most of your fat sources from fish, nuts and vegetable oils.       When should you be concerned?       Your health care provider is:  Madonna Carroll MD    Please call your health care provider if:    - you feel you are  getting worse or having an increase in problems    - fever greater than 101 degrees  - increasing shortness of breath  - any signs of infection (increasing redness, swelling, tenderness, warmth, change in appearance, or  increased drainage)  - blood in your urine or stool  - coughing or vomiting blood  - nausea (upset stomach) and vomiting and/or diarrhea that will not stop  - severe pain that is not relieved by medicine, rest or ice    Call 911 if you feel you are having a medical emergency.       Why you were at the hospital       The reason(s) you were in the hospital is/are: Right-sided hip fracture                 FOLLOW-UP: She should see Madonna Carroll MD PRN.    Specialty follow-up: None    Total time spent for discharge on date of discharge: <35 minutes  I saw the patient on the date of discharge.    Harinder Chavez MD

## 2018-01-02 NOTE — TELEPHONE ENCOUNTER
Patient Information     Patient Name MRN Yamilet Gurrola [id number] Female 3/31/1928      Telephone Encounter by Daisy Martino at 2017  1:11 PM     Author:  Daisy Martino Service:  (none) Author Type:  NURS- Registered Nurse     Filed:  2017  1:16 PM Encounter Date:  2017 Status:  Signed     :  Daisy Martino (NURS- Registered Nurse)            URGENT: Response needed within 24 hours    This timeframe is established by CMS as  best practice  for the delivery of home health care. The home health clinician may need to contact you again if this timeframe is not met.      S:    Medication reconciliation discrepancies and/or drug interactions/contraindications have been identified.  Home Care s drug regime review has revealed significant medication issues.    B:    You are being contacted for orders related to medication issues.     A:     Client was seen for admission to home care, no drug to drug interactions noted. See the following medication discrepancies:    1. She is taking morphine sulfate 20 mg/ml 0.1 ml po every 2 hours as needed.  2. She uses tolnaftate 1% topical daily as needed to fungal toenails.     Thanks, SALMA ThomasN, RN ....................  2017   1:16 PM Ext. [phone]      R:    Please evaluate this information and indicate below whether or not changes are required. A copy of the patient's drug interaction/contraindications report is available upon request.     Thank you for your time. Please call with questions or concerns.

## 2018-01-02 NOTE — TELEPHONE ENCOUNTER
Patient Information     Patient Name MRN Sex Yamilet Patel [id number] Female 3/31/1928      Telephone Encounter by Madonna Carroll MD at 2017  4:45 PM     Author:  Madonna Carroll MD Service:  (none) Author Type:  Physician     Filed:  2017  4:46 PM Encounter Date:  2017 Status:  Signed     :  Madonna Carroll MD (Physician)            Fine, but I have not done a face to face and will not sign an order that says so.  Dr. Le's exam should suffice.

## 2018-01-02 NOTE — TELEPHONE ENCOUNTER
Patient Information     Patient Name MRN Yamilet Gurrola [id number] Female 3/31/1928      Telephone Encounter by Tania Mendoza at 2017 12:58 PM     Author:  Tania Mendoza Service:  (none) Author Type:  NURS- Licensed Practical Nurse     Filed:  2017  1:10 PM Encounter Date:  2017 Status:  Signed     :  Tania Mendoza (NURS- Licensed Practical Nurse)            The patient was  discharged from [facility] with orders from  for Home Care services skilled nursing and physical therapy to evaluate and treat. Are you agreeable to Home Care providing services starting on _2017, per patient s/POA's request?

## 2018-01-02 NOTE — TELEPHONE ENCOUNTER
"Patient Information     Patient Name MRN Yamilet Gurrola [id number] Female 3/31/1928      Telephone Encounter by Maday Pruitt at 2017  4:11 PM     Author:  Maday Pruitt Service:  (none) Author Type:  (none)     Filed:  2017  4:13 PM Encounter Date:  2017 Status:  Signed     :  Maday Pruitt            Request for Home Care Physical Therapy orders as follows:    PT eval and treat date:  17    Frequency =  2 x week x 4 weeks              Effective date = 17    Interventions include:    Therapeutic Exercise  Gait Training  Gait/Balance/Dysfunction  Home Exercise Program  Home Safety Assessment    **Request for Manual Wheelchair order to be faxed to [facility] due to impaired mobility.    Acknowledging this order with an \"OK\" will suffice. Thank you for your time.  Please call if you have any questions or concerns.    Therapist:  Shabana Rivera, PT          "

## 2018-01-03 NOTE — TELEPHONE ENCOUNTER
Patient Information     Patient Name MRN Yamilet Gurrola [id number] Female 3/31/1928      Telephone Encounter by Imelda Morrell RN at 2017  2:20 PM     Author:  Imelda Morrell RN Service:  (none) Author Type:  NURS- Registered Nurse     Filed:  2017  2:20 PM Encounter Date:  2017 Status:  Signed     :  Imelda Morrell RN (NURS- Registered Nurse)            Daughter was notified and transferred to [facility].  Imelda Morrell RN........2017 2:20 PM

## 2018-01-03 NOTE — TELEPHONE ENCOUNTER
Patient Information     Patient Name MRN Yamilet Gurrola [id number] Female 3/31/1928      Telephone Encounter by Sanjana Rosa RN at 2017  4:44 PM     Author:  Sanjana Rosa RN Service:  (none) Author Type:  (none)     Filed:  2017  4:46 PM Encounter Date:  2017 Status:  Signed     :  Sanjana Rosa RN (NURS- Registered Nurse)            Office visit in the past 12 months or per provider note.    Last visit with [facility] was on: 2017 in [facility]  Next visit with [facility] is on: No future appointment listed with this provider  Next visit with Family Practice is on: No future appointment listed in this department    Max refill for 12 months from last office visit or per provider note.    Office visit in the past 12 months or per provider note.    Last visit with [facility] was on: 2017 in [facility]  Next visit with [facility] is on: No future appointment listed with this provider  Next visit with Family Practice is on: No future appointment listed in this department    Max refill for 12 months from last office visit or per provider note.    Prescription refilled per RN Medication Refill Policy.................... Sanjana Rosa ....................  2017   4:44 PM

## 2018-01-03 NOTE — TELEPHONE ENCOUNTER
Patient Information     Patient Name MRN Sex Yamilet Patel [id number] Female 3/31/1928      Telephone Encounter by Maday Pruitt at 2017 11:06 AM     Author:  Maday Pruitt Service:  (none) Author Type:  (none)     Filed:  2017 11:08 AM Encounter Date:  2017 Status:  Signed     :  Maday Pruitt Dr., are you willing to order the manual wheelchair for impaired mobility for the patient and have it faxed to [facility]?    Thank you,   Shabana Rivera, PT

## 2018-01-03 NOTE — PROGRESS NOTES
Patient Information     Patient Name MRN Sex Yamilet Patel [id number] Female 3/31/1928      Progress Notes by Madonna Carroll MD at 2017 10:45 AM     Author:  Madonna Carroll MD Service:  (none) Author Type:  Physician     Filed:  2017  5:59 PM Encounter Date:  2017 Status:  Signed     :  Madonna Carroll MD (Physician)            Nursing Notes:   Susan Montanez  2017 10:55 AM  Signed  Patient is here today for a hospital follow up. Her daughter states they need homecare orders and a wheel chair order today.  Susan Montanez LPN.......................... 2017  10:50 AM       Subjective:  Yamilet Chavarria is a 88 y.o. female who presents for face to face; hospital follow-up She is accompanied by her daughter Obey Brennan, who is her healthcare power of .      Patient she here with her daughter  as needing  a wheelchair.    Patient was evaluated in the emergency room on 1/3/2017 after she hadn't been walking as much. In the emergency room it was discovered that she had a minimally displaced left trochanteric fracture and a nondisplaced pubic ramus fracture and lumbar compression fracture. She was evaluated by orthopedics after being placed in the hospital for orthopedic surgery consultation and pain management. It was felt that it was nonsurgical and that patient could ambulate as tolerated but would need assistive devices. She already lives in an assisted living facility. She was discharged on 17 back to home.          She is unable to walk without assistance with both a personal aide and walker for short distances.  She is able to walk at most 10- 15 feet.   She also requires a wheelchair for long distances.   she is here for home care face to face exam per medicare/ homecare guidelines.   She has been receiving homecare physical therapy.   She has learned how to transfer.  She has alzheimers.  She requires morphine  infrequently for pain. Daughter feels that she is in more pain than she is able to describe secondary to her end-stage Alzheimer's.      AFIB  During ER visit, patient noted to be in afib.  She is a falls risk.  Daughter, who is MD, does not want her on aspirin.         Review of systems  no fevers or chills; no nausea vomiting or diarrhea. No constipation She is anxious at times. No sundowning. She tends not to wait for people to help her get up. She needs redirection and constant supervision. She is in need of help with all her ADLs.    Allergies: reviewed in EMR  Medications: reviewed in EMR  Problem List/PMH: reviewed in EMR    Social Hx:  Social History     Substance Use Topics       Smoking status: Never Smoker     Smokeless tobacco: Never Used     Alcohol use No     Social History Narrative    Lives at [facility];  Daughter is Obey Brennan MD who is also healthcare power of .  [phone];   Never smoker.  Non drinker.          Family Hx:   No family history on file.    Objective:  Visit Vitals       /70     Pulse 84     Resp 20     Breastfeeding No       patient is alert oriented to person only.  She is able to stand up with use of her wheelchair in locked position. Her legs are wobbly. It takes her 10-15 seconds to stand up. Once she is up she requires assistance of another person to hold both of her hands. She walks in a short step-like fashion. She has discomfort when picking up her right leg. She needs assistance to sit back down into wheelchair. She appears uncomfortable but denies pain. PERRLA EOMI moist mucous membranes neck is supple lungs clear heart sounds regular abdomen is soft nontender without pedis or megaly or CVA tenderness. Extremities are without edema. She has discomfort with palpation over her left hip though mild. Mild discomfort with internal/external rotation of her left hip; nontender rotation of right hip .   Skin is warm and intact.      BMI=  20.5    Assessment:    ICD-10-CM    1. Closed nondisplaced fracture of greater trochanter of femur with routine healing, unspecified laterality, subsequent encounter S72.116D    2. Compression fracture of vertebral column with delayed healing M48.50XG    3. Generalized weakness R53.1    4. Persistent atrial fibrillation (HC) I48.1         Will continue to monitor patient's pulse.  At this time hold off on betablocker for rate control given falls risk and low normal blood pressure.  Pulse likely elevated. Due to pain.     Ms. Chavarria's There is no height or weight on file to calculate BMI. This is out of the normal range for a 88 y.o. Normal range for ages 18-64 is between 18.5 and 24.9; normal range for ages 65+ is 23-30. To gain weight we reviewed risks and benefits of several options including diet, exercise, and supplements. Patient's strategy will be eat as able, supplements with meals if needed       Plan:   -- Expected clinical course discussed   -- Medications and their side effects discussed  Patient Instructions   PT for another week by home care  Patient's daughter declined aspirin for afib;            Electronically signed by Madonna Carroll MD          Home Health Certification/Face to Face Attestation:     I certify that this patient is under my care and that I, or a nurse practitioner (NP) with whom I collaborate or physician assistant (PA) whom I supervise, had a face-to-face encounter that meets the face-to-face encounter requirements with this patient during this visit.    Date of the Face to Face Encounter:  1/18/17    I certify, based on my findings, review of the medical records, and/or collaboration with the NP or PA, the following services are medically necessary home health services: home PT this past two weeks and okay additional week.  Will need wheel chair long term for assistance with mobility       Clinical findings support the need for the above services for this patient because  of:  Activity intolerance , Significant weakness and decreased strength/endurance and balance as well as pain .   She can walk total of 15 feet with assistance with walker &  Personal assist .    This patient is homebound based on my clinical findings, review of the medical records, and/or collaboration with the NP or the PA whom I supervise because:   There is a taxing effort to leave the home due to balance and pain issues and need for constant supervision due to advancing alzheimer's. .   The patient requires assistance to leave the home for safety due to  Falls during winter/ transfers.     Patient requires the assistance of another individual, a walker and a wheelchair in order to leave the home.    The services identified in this certification are medically necessary home health services.         Electronically signed,   Madonna Carroll MD ....................  1/18/2017   11:20 AM

## 2018-01-03 NOTE — TELEPHONE ENCOUNTER
"Patient Information     Patient Name MRN Yamilet Gurrola [id number] Female 3/31/1928      Telephone Encounter by Maday Pruitt at 2017  2:57 PM     Author:  Maday Pruitt Service:  (none) Author Type:  (none)     Filed:  2017  2:59 PM Encounter Date:  2017 Status:  Signed     :  Maday Pruitt            Request for Home Care Physical Therapy orders as follows:      Continuation frequency =  2 x week x __2__ weeks              Effective date =  17    Interventions include:    Therapeutic Exercise  Gait Training  Gait/Balance/Dysfunction  Home Exercise Program  Home Safety Assessment      Acknowledging this order with an \"OK\" will suffice. Thank you for your time.  Please call if you have any questions or concerns.    Therapist:  Shabana Rivera, PT        "

## 2018-01-03 NOTE — TELEPHONE ENCOUNTER
Patient Information     Patient Name MRN Yamilet Gurrola [id number] Female 3/31/1928      Telephone Encounter by Madonna Carroll MD at 2017 11:28 AM     Author:  Madonna Carroll MD Service:  (none) Author Type:  Physician     Filed:  2017 11:29 AM Encounter Date:  2017 Status:  Signed     :  Madonna Carroll MD (Physician)            NO;   Patient does not need physical therapy.  She is swimming at [facility].

## 2018-01-03 NOTE — TELEPHONE ENCOUNTER
Patient Information     Patient Name MRN Sex Yamilet Patel [id number] Female 3/31/1928      Telephone Encounter by aMdonna Carroll MD at 1/10/2017  3:52 PM     Author:  Madonna Carroll MD Service:  (none) Author Type:  Physician     Filed:  1/10/2017  3:53 PM Encounter Date:  2017 Status:  Signed     :  Madonna Carroll MD (Physician)            MD answered question as below

## 2018-01-03 NOTE — TELEPHONE ENCOUNTER
Patient Information     Patient Name MRN Sex Yamilet Patel [id number] Female 3/31/1928      Telephone Encounter by Madonna Carroll MD at 2017 12:37 PM     Author:  Madonna Carroll MD Service:  (none) Author Type:  Physician     Filed:  2017 12:50 PM Encounter Date:  2017 Status:  Signed     :  Madonna Carroll MD (Physician)            I believe I took care of the wheel chair.   If not reprint and send to [facility].     Office visit only if her insurance requires a face to face, given CT results as below.       IMPRESSION:       1.  Findings consistent with spinal stenosis in the lumbar spine.    2.  Minimally displaced fracture through the left greater trochanter is   unhealed.  (4:51-63).    3.  There may be incompletely healed left pubic rami fractures.    4.  Possible nondisplaced fractures of the right hemisacrum     THIS DOCUMENT HAS BEEN ELECTRONICALLY SIGNED BY RAJAN KUHN MD

## 2018-01-03 NOTE — PATIENT INSTRUCTIONS
Patient Information     Patient Name MRN Sex Yamilet Patel [id number] Female 3/31/1928      Patient Instructions by Madonna Carroll MD at 2017 10:45 AM     Author:  Madonna Carroll MD  Service:  (none) Author Type:  Physician     Filed:  2017  5:58 PM  Encounter Date:  2017 Status:  Addendum     :  Madonna Carroll MD (Physician)        Related Notes: Original Note by Madonna Carroll MD (Physician) filed at 2017 11:34 AM            PT for another week by home care  Patient's daughter declined aspirin for afib;

## 2018-01-03 NOTE — TELEPHONE ENCOUNTER
Patient Information     Patient Name MRN Sex Yamilet Patel [id number] Female 3/31/1928      Telephone Encounter by Madonna Carroll MD at 1/10/2017 10:19 AM     Author:  Madonna Carroll MD Service:  (none) Author Type:  Physician     Filed:  1/10/2017 10:20 AM Encounter Date:  2017 Status:  Signed     :  Madonna Carroll MD (Physician)            Fine with current meds

## 2018-01-03 NOTE — TELEPHONE ENCOUNTER
Patient Information     Patient Name MRN Sex Yamilet Patel [id number] Female 3/31/1928      Telephone Encounter by Imelda Morrell RN at 2017  1:11 PM     Author:  Imelda Morrell RN Service:  (none) Author Type:  NURS- Registered Nurse     Filed:  2017  1:15 PM Encounter Date:  2017 Status:  Signed     :  Imelda Morrell RN (NURS- Registered Nurse)            Per [facility], patient requires face-to-face prior to receiving wheelchair due to insurance. Message left for daughter to return call.  Imelda Morrell RN........2017 1:15 PM

## 2018-01-03 NOTE — TELEPHONE ENCOUNTER
Patient Information     Patient Name MRN Yamilet Gurrola [id number] Female 3/31/1928      Telephone Encounter by Susan Montanez at 2017 11:11 AM     Author:  Susan Montanez Service:  (none) Author Type:  (none)     Filed:  2017 11:11 AM Encounter Date:  2017 Status:  Signed     :  Susan Montanez            Spoke to home care they states that Patient's daughter is going to schedule a face to face visit. Nothing has been scheduled at this time.  Susan Montanez LPN.......................... 2017  11:11 AM

## 2018-01-04 NOTE — TELEPHONE ENCOUNTER
Patient Information     Patient Name MRN Sex Yamilet Patel [id number] Female 3/31/1928      Telephone Encounter by Temi Andrea at 3/28/2017  1:04 PM     Author:  Temi Andrea Service:  (none) Author Type:  (none)     Filed:  3/28/2017  1:06 PM Encounter Date:  3/28/2017 Status:  Signed     :  Teim Andrea            Another Resident was recently DX with Influenza A and they would like to do prophylactic Treatment with Tamiflu

## 2018-01-04 NOTE — TELEPHONE ENCOUNTER
Patient Information     Patient Name MRN Yamilet Gurrola [id number] Female 3/31/1928      Telephone Encounter by Temi Andrea at 3/28/2017  4:40 PM     Author:  Temi Andrea Service:  (none) Author Type:  (none)     Filed:  3/28/2017  4:40 PM Encounter Date:  3/28/2017 Status:  Signed     :  Temi Andrea            Left message that tamiflu was sent to pharmacy  Temi Andrea LPN........................3/28/2017  4:40 PM

## 2018-01-05 NOTE — TELEPHONE ENCOUNTER
Patient Information     Patient Name MRN Yamilet Gurrola [id number] Female 3/31/1928      Telephone Encounter by Khadra Benjamin RN at 2017  9:40 AM     Author:  Khadra Benjamin RN Service:  (none) Author Type:  NURS- Registered Nurse     Filed:  2017  9:45 AM Encounter Date:  2017 Status:  Signed     :  Khadra Benjamin RN (NURS- Registered Nurse)            LORazepam (ATIVAN) 0.5 mg tab  TAKE ONE TABLET BY MOUTH EVERY 4 HOURS IF NEEDED FOR ANXIETY, SLEEP OR MUSCLE SPASMS       Disp: 30 tablet Refills:     Class: eRx Start: 2017    For: Closed nondisplaced fracture of greater trochanter of left femur with routine healing, subsequent encounter  Originally ordered: 4 months ago by Nikita Le MD  Last refill:2017  To be filled at: [facility], [address]: [phone]    Last visit with Madonna Carroll MD  was on: 2017  PCP:  Madonna Carroll MD  Controlled Substance Agreement:  None noted        Unable to complete prescription refill per RN Medication Refill Policy.................... KHADRA BENJAMIN RN ....................  2017   9:41 AM

## 2018-01-26 VITALS — DIASTOLIC BLOOD PRESSURE: 70 MMHG | HEART RATE: 84 BPM | RESPIRATION RATE: 20 BRPM | SYSTOLIC BLOOD PRESSURE: 110 MMHG
